# Patient Record
Sex: MALE | Race: WHITE | URBAN - METROPOLITAN AREA
[De-identification: names, ages, dates, MRNs, and addresses within clinical notes are randomized per-mention and may not be internally consistent; named-entity substitution may affect disease eponyms.]

---

## 2018-01-16 ENCOUNTER — HOSPITAL ENCOUNTER (EMERGENCY)
Facility: CLINIC | Age: 38
Discharge: HOME | End: 2018-01-16
Attending: EMERGENCY MEDICINE | Admitting: EMERGENCY MEDICINE
Payer: MEDICAID

## 2018-01-16 VITALS
TEMPERATURE: 98.4 F | RESPIRATION RATE: 16 BRPM | DIASTOLIC BLOOD PRESSURE: 105 MMHG | OXYGEN SATURATION: 97 % | SYSTOLIC BLOOD PRESSURE: 160 MMHG

## 2018-01-16 DIAGNOSIS — F19.10 POLYSUBSTANCE ABUSE (H): ICD-10-CM

## 2018-01-16 PROCEDURE — 99285 EMERGENCY DEPT VISIT HI MDM: CPT

## 2018-01-16 NOTE — ED NOTES
Pt walked out by Security.  Pt is demanding a ride to get money being wired to him by mom or demanding a free cab ride to [place].  Pt states he is uninsured and doesn't know where he lives.

## 2018-01-16 NOTE — ED NOTES
Bed: [facility]  Expected date:   Expected time:   Means of arrival:   Comments:  412  38 M off meds  1553

## 2018-01-16 NOTE — ED NOTES
Pt refused to stay Dr Beckett notified that pt wants to leave [facility], refuses to be registered.  Pt is discharged per Dr Beckett

## 2018-01-16 NOTE — DISCHARGE INSTRUCTIONS
Recommend you get into see a physician and possibly a psychiatrist.  You may need to get back into treatment for your drug abuse.  Get plugged back into [employer] and avoid people who are doing drugs.        Recovering from Addiction: Coping with Relapse  Drug and alcohol abuse changes the brain in ways that continue long after the abuse ends. Because of this, people who are addicted to drugs or alcohol are at risk for relapse. This is true even after long periods of staying drug- or alcohol-free. Like other chronic diseases, substance addiction needs to be managed daily. A person who is addicted to drugs or alcohol needs a plan to help prevent, or manage, a relapse.  You will need ongoing treatment and support. Your best chance for long-term recovery will likely be a combination of medicines and behavioral therapy. Other tips include:  Stick with your treatment plan. It may seem like you've recovered and you don't need to keep taking steps to stay drug- or alcohol-free. Your chances of staying sober are much higher if you continue treatment after you recover. If you are thinking about stopping treatment, talk to a professional first.  Get help immediately if you use the drug again. If you start using again, talk with your healthcare provider or someone else who can help you right away.  Get loved ones involved in your recovery. A form of therapy called community reinforcement and family training focuses on counseling and training for your family. The therapist teaches your family how to help motivate you to seek treatment. This therapy also helps the family recognize situations that may lead you to drink or use drugs. Other family oriented recovery programs, such as Alcoholics Anonymous and Al-Anon, are available in communities nationwide.  Learn healthy ways to cope with stress, anger, boredom, or other triggers. Behavioral therapy can help you learn ways for coping with drug or alcohol cravings. It can also teach  you ways to avoid drugs and prevent relapse, and help you deal with relapse if it occurs.   Date Last Reviewed: 2/1/2017 2000-2017 [employer]. [address] [place] [address], [place] [address]. All rights reserved. This information is not intended as a substitute for professional medical care. Always follow your healthcare professional's instructions.

## 2018-01-17 NOTE — ED PROVIDER NOTES
CHIEF COMPLAINT:  Drug abuse.      HISTORY OF PRESENT ILLNESS:  John is a 37-year-old male who was brought in by police because he was acting hyper.  He admits to using meth today.  He has used IV drugs recently with heroin.  He admits that he is a convicted felon and had spent 10-12 years in assisted.  He does not have any complaints today.  He just feels very disappointed in himself for relapsing into his drug abuse.  He has been off medications, he states Klonopin, Adderall and Prozac, because he cannot afford them.  He states he does not have a doctor at this time.  He also admits to being homeless with his wife and they have been moving from shelter to shelter or living in a trailer for a while and now he wants to go stay with his mom down in [place].  The patient denies suicidal ideation.  He is not hallucinating or hearing voices.  He wants to be discharged.  He denies alcohol use today.  No other associated signs or symptoms.  He does not feel physically sick.      PAST MEDICAL HISTORY:  ADHD, generalized anxiety disorder, impulsiveness, personality disorder, likely antisocial, history of depression, history of a suicide attempt in the past.  He has had knee surgery and polysubstance abuse with meth, heroin and marijuana.      MEDICATIONS:  None at this time.      ALLERGIES:  HALDOL.       SOCIAL HISTORY:  He smokes cigarettes, uses meth, injects heroin and sometimes marijuana.  Denies alcohol use at this time.  He is homeless.      REVIEW OF SYSTEMS:   CONSTITUTIONAL:  He denies fevers or chills.   NEUROLOGIC:  No headache.  The remainder of review of systems is reviewed and are negative.      PHYSICAL EXAMINATION:   VITAL SIGNS:  Blood pressure 160/105, pulse 82, temperature 98.4, O2 sats 97%.   GENERAL:  He is awake and alert.  He is somewhat animated and somewhat hyper.  Teary eyed at times.  Made good eye contact with me.   HEENT:  Grossly normal.  He does have some acne lesions on his face and  forehead.   SKIN:  The rest of the skin exam reveals track viraj in his left antecubital; it looks old.  It is scabbed and healing.  No new track marks noted.  Does have multiple tattoos.   MUSCULOSKELETAL:  Well-developed and muscular.   LUNGS:  Clear to auscultation.   HEART:  Heart sounds are normal.  He is not tachycardic.   EYES:  Pupils are equal and reactive.  They are not dilated or small at this time.   NEUROLOGIC:  He is awake and alert, somewhat animated, fidgety but appropriate.     PSYCHIATRIC:  Sad at times, but appropriately so.  Makes good eye contact.  I do not feel that he is suicidal.  He denies it at this time.  No obvious psychosis or hallucinations at this time.      EMERGENCY DEPARTMENT COURSE:  I spent at least 15-20 minutes talking to the patient.  I do not feel he is a danger to himself or others.  I do admit that he has used substance today and he is likely animated and a little bit hyper because of the meth, but he states that he will contact his mom and she was planning to come up and get him.  I will be discharging him.  He does have resources to get to shelters.  In my best judgment, I do not feel he is a danger to himself or others, and I do recommend that he go to see a doctor and get into treatment.      IMPRESSION:  Polysubstance abuse.      PLAN:  Recommend you get in to see a physician and possibly a psychiatrist.  You may need to get back into treatment for your drug abuse.  Get plugged back into your [employer] and avoid people who are doing drugs.         LISA EASTON MD             D: 2018 16:57   T: 2018 20:07   MT: ELLEN      Name:     ROBEL YOUNG   MRN:      [id number]        Account:      [id number]   :      1980           Visit Date:   2018      Document: [id number]

## 2021-05-30 ENCOUNTER — RECORDS - HEALTHEAST (OUTPATIENT)
Dept: ADMINISTRATIVE | Facility: CLINIC | Age: 41
End: 2021-05-30